# Patient Record
Sex: MALE | URBAN - METROPOLITAN AREA
[De-identification: names, ages, dates, MRNs, and addresses within clinical notes are randomized per-mention and may not be internally consistent; named-entity substitution may affect disease eponyms.]

---

## 2017-01-06 ENCOUNTER — IMPORTED ENCOUNTER (OUTPATIENT)
Dept: URBAN - METROPOLITAN AREA CLINIC 1 | Facility: CLINIC | Age: 82
End: 2017-01-06

## 2017-01-06 PROBLEM — H35.363: Noted: 2017-01-06

## 2017-01-06 PROBLEM — D31.32: Noted: 2017-01-06

## 2017-01-06 PROBLEM — H04.123: Noted: 2017-01-06

## 2017-01-06 PROBLEM — H01.005: Noted: 2017-01-06

## 2017-01-06 PROBLEM — Z96.1: Noted: 2017-01-06

## 2017-01-06 PROBLEM — H01.002: Noted: 2017-01-06

## 2017-01-06 PROBLEM — H16.143: Noted: 2017-01-06

## 2017-01-06 PROCEDURE — 92015 DETERMINE REFRACTIVE STATE: CPT

## 2017-01-06 PROCEDURE — 92004 COMPRE OPH EXAM NEW PT 1/>: CPT

## 2017-01-06 NOTE — PATIENT DISCUSSION
1.  Choroidal Nevus OS: Appears Benign and stable. Observe for changes. 2. YESSICA w/ PEK OU-The increase of artificial tears OU TID were recommended. 3.  Blepharitis anterior type OU- The start of daily warm compresses and lid scrubs were recommended. 4. Pseudophakia OU (done else where)- H/o Yag OU. 5.  Macular Drusen OU- Observe for changes6. Return for an appointment for a10/check K in 6 months with Dr. Julissa Gimenez.

## 2021-07-14 NOTE — PROCEDURE NOTE: CLINICAL
PROCEDURE NOTE: Removal of Conjunctival FB, Embedded #1 OD. Diagnosis: Foreign Body in Conjunctival Sac. Anesthesia: Topical. Prior to treatment, the risks/benefits/alternatives were discussed. The patient wished to proceed with procedure. Embedded conjunctival FB removed with forcep/needle. Globe and conjunctiva intact. Patient tolerated procedure well. There were no complications. Post procedure instructions given. Medardo [place]

## 2022-04-02 ASSESSMENT — VISUAL ACUITY
OD_SC: 20/40+2
OD_CC: J2
OS_SC: 20/40+2
OS_CC: J2

## 2022-04-02 ASSESSMENT — TONOMETRY
OS_IOP_MMHG: 15
OD_IOP_MMHG: 12